# Patient Record
Sex: FEMALE | Race: BLACK OR AFRICAN AMERICAN | Employment: FULL TIME | URBAN - METROPOLITAN AREA
[De-identification: names, ages, dates, MRNs, and addresses within clinical notes are randomized per-mention and may not be internally consistent; named-entity substitution may affect disease eponyms.]

---

## 2018-02-22 ENCOUNTER — TELEPHONE (OUTPATIENT)
Dept: OBGYN | Age: 23
End: 2018-02-22

## 2018-02-22 DIAGNOSIS — N76.0 ACUTE VAGINITIS: Primary | ICD-10-CM

## 2018-02-26 RX ORDER — METRONIDAZOLE 7.5 MG/G
GEL VAGINAL
Qty: 1 TUBE | Refills: 0 | Status: SHIPPED | OUTPATIENT
Start: 2018-02-26 | End: 2018-03-05

## 2018-03-09 ENCOUNTER — TELEPHONE (OUTPATIENT)
Dept: OBGYN | Age: 23
End: 2018-03-09

## 2018-03-09 DIAGNOSIS — N76.0 VAGINOSIS: Primary | ICD-10-CM

## 2018-03-09 RX ORDER — METRONIDAZOLE 500 MG/1
500 TABLET ORAL 2 TIMES DAILY
Qty: 14 TABLET | Refills: 0 | Status: SHIPPED | OUTPATIENT
Start: 2018-03-09 | End: 2018-03-16

## 2018-03-30 DIAGNOSIS — N93.8 DUB (DYSFUNCTIONAL UTERINE BLEEDING): Primary | ICD-10-CM

## 2018-03-30 RX ORDER — NORETHINDRONE ACETATE AND ETHINYL ESTRADIOL AND FERROUS FUMARATE 1MG-20(24)
1 KIT ORAL DAILY
Qty: 28 TABLET | Refills: 1 | Status: SHIPPED | OUTPATIENT
Start: 2018-03-30 | End: 2018-05-17 | Stop reason: ALTCHOICE

## 2018-04-16 ENCOUNTER — TELEPHONE (OUTPATIENT)
Dept: OBGYN | Age: 23
End: 2018-04-16

## 2018-05-17 ENCOUNTER — OFFICE VISIT (OUTPATIENT)
Dept: OBGYN | Age: 23
End: 2018-05-17
Payer: COMMERCIAL

## 2018-05-17 ENCOUNTER — HOSPITAL ENCOUNTER (OUTPATIENT)
Age: 23
Setting detail: SPECIMEN
Discharge: HOME | End: 2018-05-17
Payer: COMMERCIAL

## 2018-05-17 VITALS
WEIGHT: 173.2 LBS | DIASTOLIC BLOOD PRESSURE: 80 MMHG | HEIGHT: 69 IN | BODY MASS INDEX: 25.65 KG/M2 | SYSTOLIC BLOOD PRESSURE: 118 MMHG

## 2018-05-17 DIAGNOSIS — N76.0 ACUTE VAGINITIS: ICD-10-CM

## 2018-05-17 DIAGNOSIS — Z01.419 ENCOUNTER FOR ANNUAL ROUTINE GYNECOLOGICAL EXAMINATION: ICD-10-CM

## 2018-05-17 DIAGNOSIS — Z00.00 WELLNESS EXAMINATION: Primary | ICD-10-CM

## 2018-05-17 LAB — HGB, POC: 14.1

## 2018-05-17 PROCEDURE — G0145 SCR C/V CYTO,THINLAYER,RESCR: HCPCS

## 2018-05-17 PROCEDURE — 87591 N.GONORRHOEAE DNA AMP PROB: CPT

## 2018-05-17 PROCEDURE — 87660 TRICHOMONAS VAGIN DIR PROBE: CPT

## 2018-05-17 PROCEDURE — 87510 GARDNER VAG DNA DIR PROBE: CPT

## 2018-05-17 PROCEDURE — 99395 PREV VISIT EST AGE 18-39: CPT | Performed by: ADVANCED PRACTICE MIDWIFE

## 2018-05-17 PROCEDURE — 87491 CHLMYD TRACH DNA AMP PROBE: CPT

## 2018-05-17 PROCEDURE — 87480 CANDIDA DNA DIR PROBE: CPT

## 2018-05-17 RX ORDER — NORGESTIMATE AND ETHINYL ESTRADIOL 7DAYSX3 28
1 KIT ORAL DAILY
Qty: 28 TABLET | Refills: 12 | Status: SHIPPED | OUTPATIENT
Start: 2018-05-17 | End: 2018-11-06 | Stop reason: SDDI

## 2018-05-17 ASSESSMENT — PATIENT HEALTH QUESTIONNAIRE - PHQ9
SUM OF ALL RESPONSES TO PHQ9 QUESTIONS 1 & 2: 2
1. LITTLE INTEREST OR PLEASURE IN DOING THINGS: 1
SUM OF ALL RESPONSES TO PHQ QUESTIONS 1-9: 2
2. FEELING DOWN, DEPRESSED OR HOPELESS: 1

## 2018-05-18 DIAGNOSIS — B96.89 BV (BACTERIAL VAGINOSIS): Primary | ICD-10-CM

## 2018-05-18 DIAGNOSIS — N76.0 BV (BACTERIAL VAGINOSIS): Primary | ICD-10-CM

## 2018-05-18 LAB
DIRECT EXAM: ABNORMAL
Lab: ABNORMAL
Lab: ABNORMAL
SPECIMEN DESCRIPTION: ABNORMAL
SPECIMEN DESCRIPTION: ABNORMAL
STATUS: ABNORMAL

## 2018-05-19 RX ORDER — METRONIDAZOLE 500 MG/1
2000 TABLET ORAL ONCE
Qty: 4 TABLET | Refills: 0 | Status: SHIPPED | OUTPATIENT
Start: 2018-05-19 | End: 2018-05-19

## 2018-05-19 ASSESSMENT — ENCOUNTER SYMPTOMS
DIARRHEA: 0
SHORTNESS OF BREATH: 0
VOMITING: 0
NAUSEA: 0
ABDOMINAL PAIN: 0
BACK PAIN: 0

## 2018-05-21 LAB
CHLAMYDIA BY THIN PREP: NEGATIVE
N. GONORRHOEAE DNA, THIN PREP: NEGATIVE

## 2018-06-13 LAB — CYTOLOGY REPORT: NORMAL

## 2018-09-06 ENCOUNTER — TELEPHONE (OUTPATIENT)
Dept: OBGYN | Age: 23
End: 2018-09-06

## 2018-09-06 DIAGNOSIS — B96.89 BACTERIAL VAGINOSIS: Primary | ICD-10-CM

## 2018-09-06 DIAGNOSIS — N76.0 BACTERIAL VAGINOSIS: Primary | ICD-10-CM

## 2018-09-06 RX ORDER — METRONIDAZOLE 500 MG/1
2000 TABLET ORAL ONCE
Qty: 4 TABLET | Refills: 3 | Status: SHIPPED | OUTPATIENT
Start: 2018-09-06 | End: 2018-09-06

## 2018-09-06 NOTE — TELEPHONE ENCOUNTER
Patient is sure she has BV again, has odor and discharge. wonders if there is anything you can give her and any preventative measures she can take.

## 2018-11-06 ENCOUNTER — OFFICE VISIT (OUTPATIENT)
Dept: OBGYN | Age: 23
End: 2018-11-06
Payer: COMMERCIAL

## 2018-11-06 VITALS
DIASTOLIC BLOOD PRESSURE: 64 MMHG | WEIGHT: 181.2 LBS | SYSTOLIC BLOOD PRESSURE: 116 MMHG | BODY MASS INDEX: 26.84 KG/M2 | HEIGHT: 69 IN

## 2018-11-06 DIAGNOSIS — Z33.2 THERAPEUTIC ABORTION: Primary | ICD-10-CM

## 2018-11-06 LAB
CONTROL: PRESENT
PREGNANCY TEST URINE, POC: NEGATIVE

## 2018-11-06 PROCEDURE — 81025 URINE PREGNANCY TEST: CPT | Performed by: ADVANCED PRACTICE MIDWIFE

## 2018-11-06 PROCEDURE — 99213 OFFICE O/P EST LOW 20 MIN: CPT | Performed by: ADVANCED PRACTICE MIDWIFE

## 2018-11-06 NOTE — PROGRESS NOTES
PROBLEM VISIT     Date of service: 2018    Tayler Hampton  Is a 20 y.o. single female    PT's PCP is: No primary care provider on file. : 1995                                             Subjective:       Patient's last menstrual period was 09/10/2018 (approximate). OB History    Para Term  AB Living   2 1 1   1 1   SAB TAB Ectopic Molar Multiple Live Births     1       1      # Outcome Date GA Lbr Fran/2nd Weight Sex Delivery Anes PTL Lv   2 TAB 10/20/18           1 Term 16 40w0d  7 lb 6 oz (3.345 kg) M Vag-Spont   BARRON           History   Smoking Status    Never Smoker   Smokeless Tobacco    Never Used        History   Alcohol Use    Yes     Comment: social       History   Sexual Activity    Sexual activity: Yes    Partners: Male       Allergies: Patient has no known allergies. Chief Complaint   Patient presents with    Elective      Follow up elective  aaprox. 2 1/2 weeks ago in [facility]. Patient is continuing with some spotting. Last Yearly:  2018    Last pap: 2018    Last HPV: na    PE:  Vital Signs  Blood pressure 116/64, height 5' 9\" (1.753 m), weight 181 lb 3.2 oz (82.2 kg), last menstrual period 09/10/2018, not currently breastfeeding. Labs:    No results found for this visit on 18. NURSE: Reinier HOWARD    HPI: here for f/u therapeutic ab follow up, continues with some spotting      PT denies fever, chills, nausea and vomiting \"feel good\" but \"dont want to go through that again\"      Objective: No acute distress  Excellent communications  Well-nourished  Assessment and Plan          Diagnosis Orders   1. Therapeutic   POCT urine pregnancy       completed TAB and patient desires nexplanon      I have discontinued Ms. Frazier's Norgestim-Eth Estrad Triphasic. No Follow-up on file. There are no Patient Instructions on file for this visit.     Over 50% of time spent on counseling and care coordination on: see assessment and

## 2018-12-17 DIAGNOSIS — N92.6 IRREGULAR MENSES: Primary | ICD-10-CM

## 2018-12-18 ENCOUNTER — PROCEDURE VISIT (OUTPATIENT)
Dept: OBGYN | Age: 23
End: 2018-12-18
Payer: COMMERCIAL

## 2018-12-18 ENCOUNTER — HOSPITAL ENCOUNTER (OUTPATIENT)
Age: 23
Setting detail: SPECIMEN
Discharge: HOME | End: 2018-12-18
Payer: COMMERCIAL

## 2018-12-18 VITALS
DIASTOLIC BLOOD PRESSURE: 60 MMHG | HEIGHT: 69 IN | WEIGHT: 184 LBS | BODY MASS INDEX: 27.25 KG/M2 | SYSTOLIC BLOOD PRESSURE: 110 MMHG

## 2018-12-18 DIAGNOSIS — N92.6 IRREGULAR MENSES: Primary | ICD-10-CM

## 2018-12-18 DIAGNOSIS — N92.6 IRREGULAR MENSES: ICD-10-CM

## 2018-12-18 LAB
DIRECT EXAM: ABNORMAL
Lab: ABNORMAL
SPECIMEN DESCRIPTION: ABNORMAL
STATUS: ABNORMAL

## 2018-12-18 PROCEDURE — 87510 GARDNER VAG DNA DIR PROBE: CPT

## 2018-12-18 PROCEDURE — 87660 TRICHOMONAS VAGIN DIR PROBE: CPT

## 2018-12-18 PROCEDURE — 11981 INSERTION DRUG DLVR IMPLANT: CPT | Performed by: ADVANCED PRACTICE MIDWIFE

## 2018-12-18 PROCEDURE — 87491 CHLMYD TRACH DNA AMP PROBE: CPT

## 2018-12-18 PROCEDURE — 87591 N.GONORRHOEAE DNA AMP PROB: CPT

## 2018-12-18 PROCEDURE — 87480 CANDIDA DNA DIR PROBE: CPT

## 2018-12-18 NOTE — PROGRESS NOTES
local anesthetic was utilized, 1 ml. The nexplanon vanessa was noted within the applicator. The Nexplanon was inserted per protocol. Placement was confirmed by palpating the device by me and the patient. A steri strip was applied. A pressure wrap was applied. The patient tolerated the procedure well. Formal restrictions were discussed in detail. She is to notify our office if any swelling, redness, temperature, or limb restriction or numbness. No baths, Pools or Lakes until Follow up. She may take Tylenol for any pain. Assessment:   Diagnosis Orders   1. Irregular menses  AL INSERTION DRUG IMPLANT DEVICE    etonogestrel (NEXPLANON) implant 68 mg    C.trachomatis N.gonorrhoeae DNA    VAGINITIS DNA PROBE     There are no active problems to display for this patient. Plan:  Return in about 1 year (around 12/18/2019) for yearly. Assessment and Plan          Diagnosis Orders   1. Irregular menses  AL INSERTION DRUG IMPLANT DEVICE    etonogestrel (NEXPLANON) implant 68 mg    C.trachomatis N.gonorrhoeae DNA    VAGINITIS DNA PROBE             We administered etonogestrel. We will continue to administer etonogestrel. Return in about 1 year (around 12/18/2019) for yearly. There are no Patient Instructions on file for this visit. Over 50% of time spent on counseling and care coordination on: see assessment and plan,  She was also counseled on her preventative [id number] maintenance recommendations and follow-up.         FF time: 15 min      Mary Richardson,12/18/2018 10:46 PM

## 2018-12-19 DIAGNOSIS — N76.0 BV (BACTERIAL VAGINOSIS): Primary | ICD-10-CM

## 2018-12-19 DIAGNOSIS — B96.89 BV (BACTERIAL VAGINOSIS): Primary | ICD-10-CM

## 2018-12-19 LAB
C TRACH DNA GENITAL QL NAA+PROBE: NEGATIVE
N. GONORRHOEAE DNA: NEGATIVE

## 2018-12-20 RX ORDER — METRONIDAZOLE 500 MG/1
500 TABLET ORAL ONCE
Qty: 4 TABLET | Refills: 0 | Status: SHIPPED | OUTPATIENT
Start: 2018-12-20 | End: 2018-12-20

## 2019-02-20 ENCOUNTER — TELEPHONE (OUTPATIENT)
Dept: OBGYN | Age: 24
End: 2019-02-20

## 2019-02-20 DIAGNOSIS — B96.89 BV (BACTERIAL VAGINOSIS): Primary | ICD-10-CM

## 2019-02-20 DIAGNOSIS — N76.0 BV (BACTERIAL VAGINOSIS): Primary | ICD-10-CM

## 2019-02-21 RX ORDER — METRONIDAZOLE 500 MG/1
TABLET ORAL
Qty: 4 TABLET | Refills: 11 | Status: SHIPPED | OUTPATIENT
Start: 2019-02-21 | End: 2020-01-22 | Stop reason: ALTCHOICE

## 2019-07-19 DIAGNOSIS — N76.0 BV (BACTERIAL VAGINOSIS): ICD-10-CM

## 2019-07-19 DIAGNOSIS — B96.89 BV (BACTERIAL VAGINOSIS): ICD-10-CM

## 2019-08-01 ENCOUNTER — OFFICE VISIT (OUTPATIENT)
Dept: OBGYN | Age: 24
End: 2019-08-01
Payer: COMMERCIAL

## 2019-08-01 VITALS
HEIGHT: 69 IN | BODY MASS INDEX: 29.77 KG/M2 | DIASTOLIC BLOOD PRESSURE: 68 MMHG | SYSTOLIC BLOOD PRESSURE: 120 MMHG | WEIGHT: 201 LBS

## 2019-08-01 DIAGNOSIS — Z01.419 WOMEN'S ANNUAL ROUTINE GYNECOLOGICAL EXAMINATION: Primary | ICD-10-CM

## 2019-08-01 PROCEDURE — 99395 PREV VISIT EST AGE 18-39: CPT | Performed by: ADVANCED PRACTICE MIDWIFE

## 2019-08-01 ASSESSMENT — PATIENT HEALTH QUESTIONNAIRE - PHQ9
SUM OF ALL RESPONSES TO PHQ QUESTIONS 1-9: 2
SUM OF ALL RESPONSES TO PHQ9 QUESTIONS 1 & 2: 2
2. FEELING DOWN, DEPRESSED OR HOPELESS: 1
SUM OF ALL RESPONSES TO PHQ QUESTIONS 1-9: 2
1. LITTLE INTEREST OR PLEASURE IN DOING THINGS: 1

## 2019-08-01 ASSESSMENT — ENCOUNTER SYMPTOMS
NAUSEA: 0
SHORTNESS OF BREATH: 0
CONSTIPATION: 0
ABDOMINAL PAIN: 0
DIARRHEA: 0
SORE THROAT: 0
BACK PAIN: 0

## 2020-01-22 ENCOUNTER — HOSPITAL ENCOUNTER (OUTPATIENT)
Age: 25
Setting detail: SPECIMEN
Discharge: HOME | End: 2020-01-22
Payer: MEDICAID

## 2020-01-22 ENCOUNTER — PROCEDURE VISIT (OUTPATIENT)
Dept: OBGYN | Age: 25
End: 2020-01-22
Payer: MEDICAID

## 2020-01-22 VITALS
BODY MASS INDEX: 28.97 KG/M2 | HEIGHT: 69 IN | DIASTOLIC BLOOD PRESSURE: 74 MMHG | SYSTOLIC BLOOD PRESSURE: 116 MMHG | WEIGHT: 195.6 LBS

## 2020-01-22 PROCEDURE — 87510 GARDNER VAG DNA DIR PROBE: CPT

## 2020-01-22 PROCEDURE — 87491 CHLMYD TRACH DNA AMP PROBE: CPT

## 2020-01-22 PROCEDURE — 87480 CANDIDA DNA DIR PROBE: CPT

## 2020-01-22 PROCEDURE — 87591 N.GONORRHOEAE DNA AMP PROB: CPT

## 2020-01-22 PROCEDURE — 87660 TRICHOMONAS VAGIN DIR PROBE: CPT

## 2020-01-22 PROCEDURE — 11982 REMOVE DRUG IMPLANT DEVICE: CPT | Performed by: ADVANCED PRACTICE MIDWIFE

## 2020-01-22 RX ORDER — NORGESTIMATE AND ETHINYL ESTRADIOL 0.25-0.035
1 KIT ORAL DAILY
Qty: 1 PACKET | Refills: 12 | Status: SHIPPED | OUTPATIENT
Start: 2020-01-22 | End: 2020-12-14

## 2020-01-22 ASSESSMENT — PATIENT HEALTH QUESTIONNAIRE - PHQ9
2. FEELING DOWN, DEPRESSED OR HOPELESS: 1
SUM OF ALL RESPONSES TO PHQ9 QUESTIONS 1 & 2: 2
1. LITTLE INTEREST OR PLEASURE IN DOING THINGS: 1
SUM OF ALL RESPONSES TO PHQ QUESTIONS 1-9: 2
SUM OF ALL RESPONSES TO PHQ QUESTIONS 1-9: 2

## 2020-01-22 NOTE — PROGRESS NOTES
PROBLEM VISIT     Date of service: 2020    Warren Lopez  Is a 30 y.o. single female    PT's PCP is: No primary care provider on file. : 1995                                             Subjective:       Patient's last menstrual period was 2020 (exact date). OB History    Para Term  AB Living   2 1 1   1 1   SAB TAB Ectopic Molar Multiple Live Births     1       1      # Outcome Date GA Lbr Fran/2nd Weight Sex Delivery Anes PTL Lv   2 TAB 10/20/18           1 Term 16 40w0d  7 lb 6 oz (3.345 kg) M Vag-Spont   BARRON        Social History     Tobacco Use   Smoking Status Never Smoker   Smokeless Tobacco Never Used        Social History     Substance and Sexual Activity   Alcohol Use Yes    Comment: social       Allergies: Patient has no known allergies. No current outpatient medications on file. Social History     Substance and Sexual Activity   Sexual Activity Yes    Partners: Male    Birth control/protection: Implant       Last Yearly:  2018    Last pap: 2018    Last HPV: na    Chief Complaint   Patient presents with    Menstrual Problem     Patient desires removal of Nexpalnon that was placed 2018. Periods are regular however are very long and heavy. discuss other options.  Sexually Transmitted Diseases     patient desires STD screening, no symptoms just wants to be safe. PE:  Vital Signs  Blood pressure 116/74, height 5' 9\" (1.753 m), weight 195 lb 9.6 oz (88.7 kg), last menstrual period 2020, not currently breastfeeding.        NURSE: Reinier HOWARD    HPI: here for nexplanon removal per patient request, also desires std screening only     PT denies fever, chills, nausea and vomiting       Objective   No acute distress  Excellent communications  Well-nourished   Pelvic Exam: GENITAL/URINARY:  External Genitalia:  General appearance; normal, Hair distribution; normal, Lesions absent  Urethral Meatus:  Size normal, Location normal, Lesions Used   Substance Use Topics    Alcohol use: Yes     Comment: social    Drug use: Yes     Types: Marijuana       No current outpatient medications on file prior to visit. Current Facility-Administered Medications on File Prior to Visit   Medication Dose Route Frequency Provider Last Rate Last Dose    etonogestrel (NEXPLANON) implant 68 mg  68 mg Subdermal Once MARCOS Maynard CNM   68 mg at 18 1501       Allergies as of 2020    (No Known Allergies)         OB History    Para Term  AB Living   2 1 1   1 1   SAB TAB Ectopic Molar Multiple Live Births     1       1      # Outcome Date GA Lbr Fran/2nd Weight Sex Delivery Anes PTL Lv   2 TAB 10/20/18           1 Term 16 40w0d  7 lb 6 oz (3.345 kg) M Vag-Spont   BARRON         Blood pressure 116/74, height 5' 9\" (1.753 m), weight 195 lb 9.6 oz (88.7 kg), last menstrual period 2020, not currently breastfeeding. PROCEDURE:  The patient was positioned comfortably on our procedure table. She was consented earlier in the appointment and the procedure risk and complications were reviewed. A sterile prep and drape was completed and 1  % lidocaine for local anesthetic was utilized. The skin had a small incision just beyond the proximal tip of the insert and utilizing blunt dissection the stylet was grasped and removed. A sterile dressing and steri-strip was applied with a pressure wrap. The patient tolerated the procedure well. Formal restrictions were discussed in detail. She is to notify our office if any swelling, redness, temperature, or limb restriction or numbness. No baths, Pools or Lakes until Follow up. Showers are allowed in 24 hours. She may take Tylenol for any pain. Assessment:   Diagnosis Orders   1.  Irregular menses  norgestimate-ethinyl estradiol (ORTHO-CYCLEN, 28,) 0.25-35 MG-MCG per tablet    TN REMOVAL DRUG IMPLANT DEVICE    C.trachomatis N.gonorrhoeae DNA    VAGINITIS DNA PROBE     There are no

## 2020-01-23 LAB
C TRACH DNA GENITAL QL NAA+PROBE: NEGATIVE
DIRECT EXAM: ABNORMAL
Lab: ABNORMAL
N. GONORRHOEAE DNA: NEGATIVE
SPECIMEN DESCRIPTION: ABNORMAL
SPECIMEN DESCRIPTION: NORMAL

## 2020-01-23 RX ORDER — METRONIDAZOLE 500 MG/1
2000 TABLET ORAL ONCE
Qty: 4 TABLET | Refills: 12 | Status: SHIPPED | OUTPATIENT
Start: 2020-01-23 | End: 2020-01-23

## 2020-02-20 ENCOUNTER — OFFICE VISIT (OUTPATIENT)
Dept: OBGYN | Age: 25
End: 2020-02-20
Payer: COMMERCIAL

## 2020-02-20 ENCOUNTER — HOSPITAL ENCOUNTER (OUTPATIENT)
Age: 25
Setting detail: SPECIMEN
Discharge: HOME | End: 2020-02-20
Payer: COMMERCIAL

## 2020-02-20 VITALS
DIASTOLIC BLOOD PRESSURE: 68 MMHG | SYSTOLIC BLOOD PRESSURE: 114 MMHG | WEIGHT: 193 LBS | HEIGHT: 69 IN | BODY MASS INDEX: 28.58 KG/M2

## 2020-02-20 PROCEDURE — 11200 RMVL SKIN TAGS UP TO&INC 15: CPT | Performed by: ADVANCED PRACTICE MIDWIFE

## 2020-02-20 PROCEDURE — 88305 TISSUE EXAM BY PATHOLOGIST: CPT

## 2020-02-20 RX ORDER — ESCITALOPRAM OXALATE 10 MG/1
TABLET ORAL
COMMUNITY
End: 2020-12-14

## 2020-02-20 ASSESSMENT — PATIENT HEALTH QUESTIONNAIRE - PHQ9
SUM OF ALL RESPONSES TO PHQ9 QUESTIONS 1 & 2: 2
SUM OF ALL RESPONSES TO PHQ QUESTIONS 1-9: 2
1. LITTLE INTEREST OR PLEASURE IN DOING THINGS: 1
SUM OF ALL RESPONSES TO PHQ QUESTIONS 1-9: 2
2. FEELING DOWN, DEPRESSED OR HOPELESS: 1

## 2020-02-24 LAB — DERMATOLOGY PATHOLOGY REPORT: NORMAL

## 2020-03-11 ENCOUNTER — OFFICE VISIT (OUTPATIENT)
Dept: OBGYN | Age: 25
End: 2020-03-11
Payer: COMMERCIAL

## 2020-03-11 VITALS
BODY MASS INDEX: 27.7 KG/M2 | DIASTOLIC BLOOD PRESSURE: 76 MMHG | WEIGHT: 187 LBS | HEIGHT: 69 IN | SYSTOLIC BLOOD PRESSURE: 124 MMHG

## 2020-03-11 PROCEDURE — G8427 DOCREV CUR MEDS BY ELIG CLIN: HCPCS | Performed by: ADVANCED PRACTICE MIDWIFE

## 2020-03-11 PROCEDURE — 99213 OFFICE O/P EST LOW 20 MIN: CPT | Performed by: ADVANCED PRACTICE MIDWIFE

## 2020-03-11 PROCEDURE — 1036F TOBACCO NON-USER: CPT | Performed by: ADVANCED PRACTICE MIDWIFE

## 2020-03-11 PROCEDURE — G8419 CALC BMI OUT NRM PARAM NOF/U: HCPCS | Performed by: ADVANCED PRACTICE MIDWIFE

## 2020-03-11 PROCEDURE — G8484 FLU IMMUNIZE NO ADMIN: HCPCS | Performed by: ADVANCED PRACTICE MIDWIFE

## 2020-03-11 NOTE — PROGRESS NOTES
PROBLEM VISIT     Date of service: 3/11/2020    Peerz Berumen  Is a 30 y.o. single female    PT's PCP is: No primary care provider on file. : 1995                                             Subjective:       Patient's last menstrual period was 2020 (approximate). OB History    Para Term  AB Living   2 1 1   1 1   SAB TAB Ectopic Molar Multiple Live Births     1       1      # Outcome Date GA Lbr Fran/2nd Weight Sex Delivery Anes PTL Lv   2 TAB 10/20/18           1 Term 16 40w0d  7 lb 6 oz (3.345 kg) M Vag-Spont   BARRON        Social History     Tobacco Use   Smoking Status Never Smoker   Smokeless Tobacco Never Used        Social History     Substance and Sexual Activity   Alcohol Use Yes    Comment: social       Allergies: Patient has no known allergies. Current Outpatient Medications:     escitalopram (LEXAPRO) 10 MG tablet, Lexapro 10 mg tablet  Take 1 tablet every day by oral route for 30 days. , Disp: , Rfl:     norgestimate-ethinyl estradiol (ORTHO-CYCLEN, 28,) 0.25-35 MG-MCG per tablet, Take 1 tablet by mouth daily, Disp: 1 packet, Rfl: 12    Social History     Substance and Sexual Activity   Sexual Activity Yes    Partners: Male    Birth control/protection: Condom, Pill       Last Yearly:  2018  Last pap: 2018    Last HPV: na    Chief Complaint   Patient presents with    Skin Tag     Follow up skin tag removal Np C/O pain. PE:  Vital Signs  Blood pressure 124/76, height 5' 9\" (1.753 m), weight 187 lb (84.8 kg), last menstrual period 2020, not currently breastfeeding.        NURSE: Reinier HOWARD    HPI: here for f/u skin tag removal, perianal, pathology showed condyloma     PT denies fever, chills, nausea and vomiting       Object Linda   No acute distress  Excellent communications  Well-nourished   Pelvic Exam: GENITAL/URINARY:  External Genitalia:  General appearance; normal, Hair distribution; normal, Lesions absent  Anus/Perineum:  Lesions

## 2020-06-09 RX ORDER — IMIQUIMOD 12.5 MG/.25G
CREAM TOPICAL
Qty: 12 EACH | Refills: 3 | Status: SHIPPED | OUTPATIENT
Start: 2020-06-09 | End: 2020-12-14 | Stop reason: ALTCHOICE

## 2020-12-14 ENCOUNTER — OFFICE VISIT (OUTPATIENT)
Dept: OBGYN | Age: 25
End: 2020-12-14
Payer: COMMERCIAL

## 2020-12-14 ENCOUNTER — HOSPITAL ENCOUNTER (OUTPATIENT)
Age: 25
Setting detail: SPECIMEN
Discharge: HOME | End: 2020-12-14
Payer: COMMERCIAL

## 2020-12-14 VITALS
DIASTOLIC BLOOD PRESSURE: 62 MMHG | WEIGHT: 185 LBS | BODY MASS INDEX: 27.4 KG/M2 | HEIGHT: 69 IN | SYSTOLIC BLOOD PRESSURE: 122 MMHG

## 2020-12-14 LAB
DIRECT EXAM: ABNORMAL
Lab: ABNORMAL
SPECIMEN DESCRIPTION: ABNORMAL

## 2020-12-14 PROCEDURE — G8419 CALC BMI OUT NRM PARAM NOF/U: HCPCS | Performed by: ADVANCED PRACTICE MIDWIFE

## 2020-12-14 PROCEDURE — G8427 DOCREV CUR MEDS BY ELIG CLIN: HCPCS | Performed by: ADVANCED PRACTICE MIDWIFE

## 2020-12-14 PROCEDURE — 87106 FUNGI IDENTIFICATION YEAST: CPT

## 2020-12-14 PROCEDURE — 87660 TRICHOMONAS VAGIN DIR PROBE: CPT

## 2020-12-14 PROCEDURE — G8484 FLU IMMUNIZE NO ADMIN: HCPCS | Performed by: ADVANCED PRACTICE MIDWIFE

## 2020-12-14 PROCEDURE — 87510 GARDNER VAG DNA DIR PROBE: CPT

## 2020-12-14 PROCEDURE — 87491 CHLMYD TRACH DNA AMP PROBE: CPT

## 2020-12-14 PROCEDURE — 87102 FUNGUS ISOLATION CULTURE: CPT

## 2020-12-14 PROCEDURE — 99214 OFFICE O/P EST MOD 30 MIN: CPT | Performed by: ADVANCED PRACTICE MIDWIFE

## 2020-12-14 PROCEDURE — 1036F TOBACCO NON-USER: CPT | Performed by: ADVANCED PRACTICE MIDWIFE

## 2020-12-14 PROCEDURE — 87480 CANDIDA DNA DIR PROBE: CPT

## 2020-12-14 PROCEDURE — 87591 N.GONORRHOEAE DNA AMP PROB: CPT

## 2020-12-14 RX ORDER — CLOBETASOL PROPIONATE 0.5 MG/G
OINTMENT TOPICAL
Qty: 30 G | Refills: 1 | Status: SHIPPED | OUTPATIENT
Start: 2020-12-14

## 2020-12-14 RX ORDER — FLUCONAZOLE 100 MG/1
100 TABLET ORAL 2 TIMES DAILY
Qty: 20 TABLET | Refills: 0 | Status: SHIPPED | OUTPATIENT
Start: 2020-12-14 | End: 2020-12-24

## 2020-12-14 NOTE — PROGRESS NOTES
PROBLEM VISIT     Date of service: 2020    Jorge Schilder  Is a 30 y.o. single female    PT's PCP is: No primary care provider on file. : 1995                                             Subjective:       Patient's last menstrual period was 2020 (exact date). OB History    Para Term  AB Living   2 1 1   1 1   SAB TAB Ectopic Molar Multiple Live Births     1       1      # Outcome Date GA Lbr Fran/2nd Weight Sex Delivery Anes PTL Lv   2 TAB 10/20/18           1 Term 16 40w0d  7 lb 6 oz (3.345 kg) M Vag-Spont   BARRON        Social History     Tobacco Use   Smoking Status Never Smoker   Smokeless Tobacco Never Used        Social History     Substance and Sexual Activity   Alcohol Use Yes    Comment: social       Allergies: Patient has no known allergies. Current Outpatient Medications:     fluconazole (DIFLUCAN) 100 MG tablet, Take 1 tablet by mouth 2 times daily for 10 days, Disp: 20 tablet, Rfl: 0    clobetasol (TEMOVATE) 0.05 % ointment, Apply topically 2 times daily. , Disp: 30 g, Rfl: 1    imiquimod (ALDARA) 5 % cream, Apply to affected area three times weekly, leave on overnight and wash off in am., Disp: 12 each, Rfl: 3    escitalopram (LEXAPRO) 10 MG tablet, Lexapro 10 mg tablet  Take 1 tablet every day by oral route for 30 days. , Disp: , Rfl:     norgestimate-ethinyl estradiol (ORTHO-CYCLEN, 28,) 0.25-35 MG-MCG per tablet, Take 1 tablet by mouth daily (Patient not taking: Reported on 2020), Disp: 1 packet, Rfl: 12    Social History     Substance and Sexual Activity   Sexual Activity Yes    Partners: Male    Birth control/protection: Condom       Last Yearly:  2018    Last pap: 2018    Last HPV: na    Chief Complaint   Patient presents with    Genital Warts     C/O vaginal warts? slight itch for approx. 2 months.          PE:  Vital Signs  Blood pressure 122/62, height 5' 9\" (1.753 m), weight 185 lb (83.9 kg), last menstrual period 2020, not currently breastfeeding. Estimated body mass index is 27.32 kg/m² as calculated from the following:    Height as of this encounter: 5' 9\" (1.753 m). Weight as of this encounter: 185 lb (83.9 kg). NURSE: Donte HOWARD    HPI: here for c/o vulvar irritation, has had genital warts before and \"thinks that what it is\"     PT denies fever, chills, nausea and vomiting       Objective   No acute distress  Excellent communications  Well-nourished   Pelvic Exam: Pelvic exam: VULVA: l. Majora normal, no evidence of lesion, l. Minora mucous membranes appear hyperglandular, very red and inflamed. Filmy white discharge around clitoral quiroz, \"all very itchy\", VAGINA: red and inflamed and copious curdish white discharge no odor, CERVIX: also covered with white curdish discharge, UTERUS: uterus is normal size, shape, consistency and nontender, ADNEXA: normal adnexa in size, nontender and no masses, RECTAL: normal rectal, no masses. Results reviewed today:    No results found for this visit on 12/14/20. Assessment and Plan          Diagnosis Orders   1. Vulvovaginitis  fluconazole (DIFLUCAN) 100 MG tablet    clobetasol (TEMOVATE) 0.05 % ointment    Culture, Fungus    VAGINITIS DNA PROBE    C.trachomatis N.gonorrhoeae DNA       appearance more suggestive of VC; will await cultures and treat empirically and consider further w/u for HPV if unimproved       I am having Elysia Frazier start on fluconazole and clobetasol. I am also having her maintain her norgestimate-ethinyl estradiol, escitalopram, and imiquimod. We will continue to administer etonogestrel. Return in about 2 weeks (around 12/28/2020), or recheck. There are no Patient Instructions on file for this visit. Over 50% of time spent on counseling and care coordination on: see assessment and plan,  She was also counseled on her preventative health maintenance recommendations and follow-up.         FF time: 15 min      Gatito Rousseau Dylan,12/14/2020 2:07 PM

## 2020-12-15 LAB
C TRACH DNA GENITAL QL NAA+PROBE: NEGATIVE
N. GONORRHOEAE DNA: NEGATIVE
SPECIMEN DESCRIPTION: NORMAL

## 2020-12-22 LAB
CULTURE: ABNORMAL
CULTURE: ABNORMAL
Lab: ABNORMAL
SPECIMEN DESCRIPTION: ABNORMAL

## 2020-12-31 ENCOUNTER — OFFICE VISIT (OUTPATIENT)
Dept: OBGYN | Age: 25
End: 2020-12-31
Payer: COMMERCIAL

## 2020-12-31 VITALS
BODY MASS INDEX: 26.96 KG/M2 | SYSTOLIC BLOOD PRESSURE: 140 MMHG | DIASTOLIC BLOOD PRESSURE: 80 MMHG | HEIGHT: 69 IN | WEIGHT: 182 LBS

## 2020-12-31 PROCEDURE — G8427 DOCREV CUR MEDS BY ELIG CLIN: HCPCS | Performed by: ADVANCED PRACTICE MIDWIFE

## 2020-12-31 PROCEDURE — 1036F TOBACCO NON-USER: CPT | Performed by: ADVANCED PRACTICE MIDWIFE

## 2020-12-31 PROCEDURE — 99212 OFFICE O/P EST SF 10 MIN: CPT | Performed by: ADVANCED PRACTICE MIDWIFE

## 2020-12-31 PROCEDURE — G8484 FLU IMMUNIZE NO ADMIN: HCPCS | Performed by: ADVANCED PRACTICE MIDWIFE

## 2020-12-31 PROCEDURE — G8419 CALC BMI OUT NRM PARAM NOF/U: HCPCS | Performed by: ADVANCED PRACTICE MIDWIFE

## 2020-12-31 RX ORDER — CLOBETASOL PROPIONATE 0.5 MG/G
CREAM TOPICAL
Qty: 1 TUBE | Refills: 1 | Status: SHIPPED | OUTPATIENT
Start: 2020-12-31

## 2020-12-31 NOTE — PROGRESS NOTES
Pelvic Exam: GENITAL/URINARY:  External Genitalia:  General appearance; normal, Hair distribution; normal, Lesions absent  Anus/Perineum:  Lesions absent and Masses absent                                                       Results reviewed today:    No results found for this visit on 12/31/20. Assessment and Plan          Diagnosis Orders   1. Vulvovaginitis  clobetasol (TEMOVATE) 0.05 % cream       no current infection or lesion, will use steroid topical as needed      I am having Elysia Frazier start on clobetasol. I am also having her maintain her clobetasol. We will continue to administer etonogestrel. Return if symptoms worsen or fail to improve. There are no Patient Instructions on file for this visit. Over 50% of time spent on counseling and care coordination on: see assessment and plan,  She was also counseled on her preventative health maintenance recommendations and follow-up.         FF time: 20 min      Apurva Richardson,12/31/2020 12:10 PM

## 2022-03-14 ENCOUNTER — TELEPHONE (OUTPATIENT)
Dept: OBGYN | Age: 27
End: 2022-03-14

## 2022-03-14 DIAGNOSIS — B37.31 YEAST VAGINITIS: Primary | ICD-10-CM

## 2022-03-14 RX ORDER — FLUCONAZOLE 150 MG/1
150 TABLET ORAL ONCE
Qty: 1 TABLET | Refills: 0 | Status: SHIPPED | OUTPATIENT
Start: 2022-03-14 | End: 2022-03-14

## 2022-03-14 NOTE — TELEPHONE ENCOUNTER
Yes diflucan 150 po x1, inform her diflucan works over three days though so may not have full effect til after we see her

## 2022-03-15 ENCOUNTER — HOSPITAL ENCOUNTER (OUTPATIENT)
Age: 27
Setting detail: SPECIMEN
Discharge: HOME | End: 2022-03-15
Payer: COMMERCIAL

## 2022-03-15 ENCOUNTER — OFFICE VISIT (OUTPATIENT)
Dept: OBGYN | Age: 27
End: 2022-03-15
Payer: COMMERCIAL

## 2022-03-15 VITALS
DIASTOLIC BLOOD PRESSURE: 68 MMHG | HEIGHT: 69 IN | BODY MASS INDEX: 28.53 KG/M2 | WEIGHT: 192.6 LBS | SYSTOLIC BLOOD PRESSURE: 120 MMHG

## 2022-03-15 DIAGNOSIS — N76.0 ACUTE VAGINITIS: Primary | ICD-10-CM

## 2022-03-15 DIAGNOSIS — N76.0 ACUTE VAGINITIS: ICD-10-CM

## 2022-03-15 LAB
CANDIDA SPECIES, DNA PROBE: NEGATIVE
GARDNERELLA VAGINALIS, DNA PROBE: POSITIVE
SOURCE: ABNORMAL
TRICHOMONAS VAGINALIS DNA: NEGATIVE

## 2022-03-15 PROCEDURE — 87660 TRICHOMONAS VAGIN DIR PROBE: CPT

## 2022-03-15 PROCEDURE — 87510 GARDNER VAG DNA DIR PROBE: CPT

## 2022-03-15 PROCEDURE — 87591 N.GONORRHOEAE DNA AMP PROB: CPT

## 2022-03-15 PROCEDURE — G8419 CALC BMI OUT NRM PARAM NOF/U: HCPCS | Performed by: ADVANCED PRACTICE MIDWIFE

## 2022-03-15 PROCEDURE — 1036F TOBACCO NON-USER: CPT | Performed by: ADVANCED PRACTICE MIDWIFE

## 2022-03-15 PROCEDURE — 87480 CANDIDA DNA DIR PROBE: CPT

## 2022-03-15 PROCEDURE — G8484 FLU IMMUNIZE NO ADMIN: HCPCS | Performed by: ADVANCED PRACTICE MIDWIFE

## 2022-03-15 PROCEDURE — 99213 OFFICE O/P EST LOW 20 MIN: CPT | Performed by: ADVANCED PRACTICE MIDWIFE

## 2022-03-15 PROCEDURE — 87491 CHLMYD TRACH DNA AMP PROBE: CPT

## 2022-03-15 PROCEDURE — G8427 DOCREV CUR MEDS BY ELIG CLIN: HCPCS | Performed by: ADVANCED PRACTICE MIDWIFE

## 2022-03-15 RX ORDER — FLUCONAZOLE 150 MG/1
150 TABLET ORAL DAILY
Qty: 3 TABLET | Refills: 0 | Status: SHIPPED | OUTPATIENT
Start: 2022-03-15

## 2022-03-15 NOTE — PROGRESS NOTES
PROBLEM VISIT     Date of service: 3/15/2022    Emily Osorio  Is a 27 y.o. single female    PT's PCP is: No primary care provider on file. : 1995                                             Subjective:       Patient's last menstrual period was 2022 (exact date). OB History    Para Term  AB Living   2 1 1   1 1   SAB IAB Ectopic Molar Multiple Live Births     1       1      # Outcome Date GA Lbr Fran/2nd Weight Sex Delivery Anes PTL Lv   2 IAB 10/20/18           1 Term 16 40w0d  7 lb 6 oz (3.345 kg) M Vag-Spont   BARRON        Social History     Tobacco Use   Smoking Status Never Smoker   Smokeless Tobacco Never Used        Social History     Substance and Sexual Activity   Alcohol Use Yes    Comment: social       Allergies: Seasonal      Current Outpatient Medications:     fluconazole (DIFLUCAN) 150 MG tablet, Take 1 tablet by mouth daily, Disp: 3 tablet, Rfl: 0    clobetasol (TEMOVATE) 0.05 % cream, Apply topically 2 times daily. (Patient not taking: Reported on 3/15/2022), Disp: 1 Tube, Rfl: 1    clobetasol (TEMOVATE) 0.05 % ointment, Apply topically 2 times daily. (Patient not taking: Reported on 2020), Disp: 30 g, Rfl: 1    Social History     Substance and Sexual Activity   Sexual Activity Yes    Partners: Male    Birth control/protection: Condom       Last Yearly:  2018    Last pap: 2018    Last HPV: never    Have you had a positive covid test: No    Have you had the covid immunization: No    Chief Complaint   Patient presents with    Vaginal Discharge     C/O vaginal irritation and itch for several days. PE:  Vital Signs  Blood pressure 120/68, height 5' 9\" (1.753 m), weight 192 lb 9.6 oz (87.4 kg), last menstrual period 2022, not currently breastfeeding. Estimated body mass index is 28.44 kg/m² as calculated from the following:    Height as of this encounter: 5' 9\" (1.753 m). Weight as of this encounter: 192 lb 9.6 oz (87.4 kg).     No

## 2022-03-21 DIAGNOSIS — B96.89 BV (BACTERIAL VAGINOSIS): Primary | ICD-10-CM

## 2022-03-21 DIAGNOSIS — N76.0 BV (BACTERIAL VAGINOSIS): Primary | ICD-10-CM

## 2022-03-21 RX ORDER — METRONIDAZOLE 500 MG/1
500 TABLET ORAL 2 TIMES DAILY
Qty: 14 TABLET | Refills: 0 | Status: SHIPPED | OUTPATIENT
Start: 2022-03-21 | End: 2022-03-28